# Patient Record
Sex: MALE | Race: WHITE | URBAN - METROPOLITAN AREA
[De-identification: names, ages, dates, MRNs, and addresses within clinical notes are randomized per-mention and may not be internally consistent; named-entity substitution may affect disease eponyms.]

---

## 2022-10-26 ENCOUNTER — OFFICE VISIT (OUTPATIENT)
Dept: URBAN - METROPOLITAN AREA CLINIC 64 | Facility: CLINIC | Age: 62
End: 2022-10-26
Payer: COMMERCIAL

## 2022-10-26 DIAGNOSIS — H10.45 OTHER CHRONIC ALLERGIC CONJUNCTIVITIS: ICD-10-CM

## 2022-10-26 DIAGNOSIS — H01.026 SQUAMOUS BLEPHARITIS LEFT EYE, UNSPECIFIED EYELID: ICD-10-CM

## 2022-10-26 DIAGNOSIS — H16.223 KERATOCONJUNCTIVITIS SICCA, BILATERAL: ICD-10-CM

## 2022-10-26 DIAGNOSIS — E11.9 TYPE 2 DIABETES MELLITUS W/O COMPLICATION: Primary | ICD-10-CM

## 2022-10-26 PROCEDURE — 99203 OFFICE O/P NEW LOW 30 MIN: CPT

## 2022-10-26 RX ORDER — TOBRAMYCIN AND DEXAMETHASONE 3; 1 MG/G; MG/G
OINTMENT OPHTHALMIC
Qty: 5 | Refills: 0 | Status: ACTIVE
Start: 2022-10-26

## 2022-10-26 ASSESSMENT — KERATOMETRY
OS: 42.22
OD: 41.80

## 2022-10-26 ASSESSMENT — INTRAOCULAR PRESSURE
OD: 20
OS: 10

## 2022-10-26 NOTE — IMPRESSION/PLAN
Impression: Type 2 diabetes mellitus w/o complication: N68.2. Plan: Patient educated on condition. Informed that DM is the #1 form of preventable blindness in the [address]. Continue strict blood sugar control with PCP. Monitor yearly.

## 2022-10-26 NOTE — IMPRESSION/PLAN
Impression: Squamous blepharitis left eye, unspecified eyelid: H01.026. Plan: Pt ed. Rx Tobradex TID x 1 week. RTC PRN.

## 2022-10-26 NOTE — IMPRESSION/PLAN
Impression: Keratoconjunctivitis sicca, bilateral: B62.275. Plan: Patient educated on condition. Recommend AT's QID and warm compresses BID x 5-10 minutes. Monitor.

## 2022-10-26 NOTE — IMPRESSION/PLAN
Impression: Other chronic allergic conjunctivitis: H10.45. Plan: Pt ed. Continue use of Pataday PRN. Monitor.

## 2022-10-27 RX ORDER — NEOMYCIN SULFATE, POLYMYXIN B SULFATE AND DEXAMETHASONE 3.5; 10000; 1 MG/ML; [USP'U]/ML; MG/ML
SUSPENSION OPHTHALMIC
Qty: 5 | Refills: 0 | Status: ACTIVE
Start: 2022-10-27

## 2023-10-25 ENCOUNTER — OFFICE VISIT (OUTPATIENT)
Dept: URBAN - METROPOLITAN AREA CLINIC 64 | Facility: LOCATION | Age: 63
End: 2023-10-25
Payer: COMMERCIAL

## 2023-10-25 DIAGNOSIS — H04.203 BILATERAL EPIPHORA: Primary | ICD-10-CM

## 2023-10-25 PROCEDURE — 99214 OFFICE O/P EST MOD 30 MIN: CPT

## 2023-10-25 ASSESSMENT — INTRAOCULAR PRESSURE
OS: 18
OD: 17

## 2023-10-25 ASSESSMENT — KERATOMETRY
OD: 41.98
OS: 42.38